# Patient Record
Sex: MALE | Race: OTHER | Employment: FULL TIME | URBAN - METROPOLITAN AREA
[De-identification: names, ages, dates, MRNs, and addresses within clinical notes are randomized per-mention and may not be internally consistent; named-entity substitution may affect disease eponyms.]

---

## 2021-10-20 ENCOUNTER — APPOINTMENT (OUTPATIENT)
Dept: CT IMAGING | Age: 21
End: 2021-10-20

## 2021-10-20 ENCOUNTER — HOSPITAL ENCOUNTER (EMERGENCY)
Age: 21
Discharge: HOME | End: 2021-10-20

## 2021-10-20 VITALS
HEIGHT: 62 IN | OXYGEN SATURATION: 96 % | TEMPERATURE: 98.4 F | BODY MASS INDEX: 29.44 KG/M2 | DIASTOLIC BLOOD PRESSURE: 93 MMHG | RESPIRATION RATE: 17 BRPM | WEIGHT: 160 LBS | SYSTOLIC BLOOD PRESSURE: 123 MMHG | HEART RATE: 69 BPM

## 2021-10-20 DIAGNOSIS — S01.01XA LACERATION OF SCALP, INITIAL ENCOUNTER: Primary | ICD-10-CM

## 2021-10-20 PROCEDURE — 12004 RPR S/N/AX/GEN/TRK7.6-12.5CM: CPT

## 2021-10-20 PROCEDURE — 6360000002 HC RX W HCPCS: Performed by: PHYSICIAN ASSISTANT

## 2021-10-20 PROCEDURE — 99284 EMERGENCY DEPT VISIT MOD MDM: CPT

## 2021-10-20 PROCEDURE — 90471 IMMUNIZATION ADMIN: CPT | Performed by: PHYSICIAN ASSISTANT

## 2021-10-20 PROCEDURE — 90715 TDAP VACCINE 7 YRS/> IM: CPT | Performed by: PHYSICIAN ASSISTANT

## 2021-10-20 PROCEDURE — 6370000000 HC RX 637 (ALT 250 FOR IP): Performed by: PHYSICIAN ASSISTANT

## 2021-10-20 PROCEDURE — 70450 CT HEAD/BRAIN W/O DYE: CPT

## 2021-10-20 RX ORDER — HYDROCODONE BITARTRATE AND ACETAMINOPHEN 5; 325 MG/1; MG/1
1 TABLET ORAL ONCE
Status: COMPLETED | OUTPATIENT
Start: 2021-10-20 | End: 2021-10-20

## 2021-10-20 RX ADMIN — TETANUS TOXOID, REDUCED DIPHTHERIA TOXOID AND ACELLULAR PERTUSSIS VACCINE, ADSORBED 0.5 ML: 5; 2.5; 8; 8; 2.5 SUSPENSION INTRAMUSCULAR at 16:01

## 2021-10-20 RX ADMIN — HYDROCODONE BITARTRATE AND ACETAMINOPHEN 1 TABLET: 5; 325 TABLET ORAL at 16:02

## 2021-10-20 ASSESSMENT — PAIN DESCRIPTION - PAIN TYPE: TYPE: ACUTE PAIN

## 2021-10-20 ASSESSMENT — PAIN SCALES - GENERAL
PAINLEVEL_OUTOF10: 5
PAINLEVEL_OUTOF10: 7
PAINLEVEL_OUTOF10: 5
PAINLEVEL_OUTOF10: 7

## 2021-10-20 ASSESSMENT — PAIN DESCRIPTION - LOCATION: LOCATION: HEAD

## 2021-10-20 NOTE — ED NOTES
iPad used for medication administration.  ID #[id number].      Georgena Moritz, RN  10/20/21 4902 [facility], RN  10/20/21 [id number]

## 2021-10-20 NOTE — ED NOTES
Wash out lace on top of head with dyn-hex and saline. Flush with 100ml of saline.      Joe Everton  10/20/21 2009

## 2021-10-22 NOTE — ED PROVIDER NOTES
[facility] Emergency Department    CHIEF COMPLAINT  Laceration (paint can fell on pt head, pt has lac, bleeding is controlled )      SHARED SERVICE VISIT  Evaluated by TAMMY. My supervising physician was available for consultation. HISTORY OF PRESENT ILLNESS  Lazarus Echols is a 22 y.o. male who presents to the ED complaining of Head injury. Patient was working as a , when a bucket of paint fell roughly 10 feet and landed on top of his head. No loss of consciousness, anticoagulation, seizures, vomiting or amnesia. No vision changes. Denies any neck pain. Unsure when last tetanus was updated. Does report a laceration to the top of his head. No other complaints, modifying factors or associated symptoms. Nursing notes reviewed. History reviewed. No pertinent past medical history. History reviewed. No pertinent surgical history. History reviewed. No pertinent family history. Social History     Socioeconomic History    Marital status: Single     Spouse name: Not on file    Number of children: Not on file    Years of education: Not on file    Highest education level: Not on file   Occupational History    Not on file   Tobacco Use    Smoking status: Never Smoker    Smokeless tobacco: Never Used   Substance and Sexual Activity    Alcohol use: Never    Drug use: Never    Sexual activity: Not Currently   Other Topics Concern    Not on file   Social History Narrative    Not on file     Social Determinants of Health     Financial Resource Strain:     Difficulty of Paying Living Expenses:    Food Insecurity:     Worried About Running Out of Food in the Last Year:     [address] in the Last Year:    Transportation Needs:     Lack of Transportation (Medical):      Lack of Transportation (Non-Medical):    Physical Activity:     Days of Exercise per Week:     Minutes of Exercise per Session:    Stress:     Feeling of Stress :    Social Connections:  Frequency of Communication with Friends and Family:     Frequency of Social Gatherings with Friends and Family:     Attends Synagogue Services:     Active Member of Clubs or Organizations:     Attends Club or Organization Meetings:     Marital Status:    Intimate Partner Violence:     Fear of Current or Ex-Partner:     Emotionally Abused:     Physically Abused:     Sexually Abused:      No current facility-administered medications for this encounter. No current outpatient medications on file. No Known Allergies    REVIEW OF SYSTEMS  7 systems reviewed, pertinent positives per HPI otherwise noted to be negative    PHYSICAL EXAM  BP (!) 123/93   Pulse 69   Temp 98.4 °F (36.9 °C) (Oral)   Resp 17   Ht 5' 2\" (1.575 m)   Wt 160 lb (72.6 kg)   SpO2 96%   BMI 29.26 kg/m²   GENERAL APPEARANCE: Awake and alert. Cooperative. HEAD: Normocephalic. Atraumatic. 3 cm x 2 cm laceration to the crown of the head. No james signs. No periorbital ecchymosis. EYES: PERRL.   ENT: Mucous membranes are moist.  No hemotympanums. NECK: Supple. No midline tenderness. HEART: RRR. No murmurs. LUNGS: Respirations unlabored. CTAB. Good air exchange. Speaking comfortably in full sentences. EXTREMITIES: No peripheral edema. Moves all extremities equally. SKIN: Warm and dry. No acute rashes. NEUROLOGICAL: Alert and oriented. No gross facial drooping. PSYCHIATRIC: Normal mood and affect. RADIOLOGY  CT Head WO Contrast   Final Result   No acute intracranial abnormality.       Partial opacification of the maxillary sinuses and ethmoid air cells   bilaterally, correlate for signs of infection               LABS  Labs Reviewed - No data to display    PROCEDURES  Unless otherwise noted below, none  Lac Repair    Date/Time: 10/22/2021 12:51 AM  Performed by: Jigna Downey PA-C  Authorized by: Jigna Downey PA-C     Consent:     Consent obtained:  Verbal    Consent given by:  Patient    Risks discussed: Pain, poor cosmetic result, poor wound healing, infection and need for additional repair    Alternatives discussed:  No treatment  Anesthesia (see MAR for exact dosages): Anesthesia method:  Local infiltration    Local anesthetic:  Lidocaine 1% w/o epi  Laceration details:     Location:  Scalp    Scalp location:  Crown    Length (cm):  3    Depth (mm):  10  Repair type:     Repair type:  Simple  Pre-procedure details:     Preparation:  Patient was prepped and draped in usual sterile fashion and imaging obtained to evaluate for foreign bodies  Exploration:     Hemostasis achieved with:  Direct pressure    Contaminated: no    Treatment:     Area cleansed with:  Saline and Hibiclens    Amount of cleaning:  Standard    Visualized foreign bodies/material removed: no    Skin repair:     Repair method:  Sutures and staples    Suture size:  3-0    Suture material:  Prolene    Suture technique:  Simple interrupted    Number of sutures:  2    Number of staples:  4  Approximation:     Approximation:  Close  Post-procedure details:     Dressing:  Antibiotic ointment    Patient tolerance of procedure: Tolerated well, no immediate complications            MDM  26 male presents emergency department evaluation of a injury to his head. Patient is [place]-speaking and a  was used throughout his visit. On arrival patient is alert and oriented however there is a laceration top of his scalp. Given the mechanism of injury being a pink and falling roughly 10 feet landing on his head did obtain CT of the head to evaluate for any acute intercranial or apparent fractures. I did not appreciate any signs of basilar skull fracture on exam.  Wound was thoroughly cleansed by the ED technician. Procedure note above. However initially repair with staple was attempted however due to the width of the wound to 3-0 Prolene sutures had to be placed for appropriate closure. Ultimately 2 sutures and 4 staples were used.   Patient tolerated well the complication. Patient discharged home with suture and staple information and instructed to return in 7 to 10 days for removal.  Discussed concussion protocol recommend take Tylenol for the pain. Patient was given dose of pain medication in the emergency department with good resolution of his pain. CT imaging demonstrated no acute intercranial abnormalities. DISPOSITION  Patient was discharged to home in good condition. CLINICAL IMPRESSION  1.  Laceration of scalp, initial encounter                   Matthew Flores PA-C  10/22/21 1194